# Patient Record
Sex: MALE | Race: BLACK OR AFRICAN AMERICAN | Employment: UNEMPLOYED | URBAN - METROPOLITAN AREA
[De-identification: names, ages, dates, MRNs, and addresses within clinical notes are randomized per-mention and may not be internally consistent; named-entity substitution may affect disease eponyms.]

---

## 2018-01-22 ENCOUNTER — OFFICE VISIT (OUTPATIENT)
Dept: CARDIOLOGY CLINIC | Age: 28
End: 2018-01-22

## 2018-01-22 ENCOUNTER — HOSPITAL ENCOUNTER (OUTPATIENT)
Dept: OTHER | Age: 28
Discharge: HOME | End: 2018-01-22
Attending: INTERNAL MEDICINE | Admitting: INTERNAL MEDICINE

## 2018-01-22 VITALS
DIASTOLIC BLOOD PRESSURE: 60 MMHG | SYSTOLIC BLOOD PRESSURE: 130 MMHG | WEIGHT: 201 LBS | HEIGHT: 78 IN | BODY MASS INDEX: 23.26 KG/M2

## 2018-01-22 DIAGNOSIS — R55 SYNCOPE, UNSPECIFIED SYNCOPE TYPE: ICD-10-CM

## 2018-01-22 DIAGNOSIS — I47.1 SVT (SUPRAVENTRICULAR TACHYCARDIA) (HCC): ICD-10-CM

## 2018-01-22 DIAGNOSIS — I47.1 AVNRT (AV NODAL RE-ENTRY TACHYCARDIA) (HCC): ICD-10-CM

## 2018-01-22 DIAGNOSIS — T82.190S: Primary | ICD-10-CM

## 2018-01-22 DIAGNOSIS — Z95.810 PRESENCE OF SINGLE CHAMBER IMPLANTABLE CARDIOVERTER-DEFIBRILLATOR (ICD): ICD-10-CM

## 2018-01-22 DIAGNOSIS — T82.190S: ICD-10-CM

## 2018-01-22 PROBLEM — I47.19 AVNRT (AV NODAL RE-ENTRY TACHYCARDIA): Status: ACTIVE | Noted: 2018-01-22

## 2018-01-22 PROBLEM — I47.19 INTRA-ATRIAL REENTRANT TACHYCARDIA: Status: ACTIVE | Noted: 2018-01-22

## 2018-01-22 PROCEDURE — 99245 OFF/OP CONSLTJ NEW/EST HI 55: CPT | Performed by: INTERNAL MEDICINE

## 2018-01-22 NOTE — PROGRESS NOTES
has signed to play with a team in [place] [facility] [place] and plans on leaving the country within the next 2-4 weeks. He denies heart racing, skipping, or irregularity of his heart rhythm. He has no had further syncope since 2015. Past Medical History:   Diagnosis Date    Arrhythmia     AVNRT - s/p ablation    Syncope and collapse         Past Surgical History:   Procedure Laterality Date    CARDIAC DEFIBRILLATOR PLACEMENT  2013       Allergies:  No Known Allergies    Social History:        Family History:  family history is not on file. Reviewed. Denies family history of sudden cardiac death, arrhythmia, premature CAD    Review of System:  All other systems reviewed and are negative except for that noted above. Pertinent negatives are:   General: negative for fever, chills   Ophthalmic ROS: negative for - eye pain or loss of vision  ENT ROS: negative for - headaches, sore throat   Respiratory: negative for - cough, sputum  Cardiovascular: Reviewed in HPI  Gastrointestinal: negative for - abdominal pain, diarrhea, N/V  Hematology: negative for - bleeding, blood clots, bruising or jaundice  Genito-Urinary:  negative for - Dysuria or incontinence  Musculoskeletal: negative for - Joint swelling, muscle pain  Neurological: negative for - confusion, dizziness, headaches   Psychiatric: No anxiety, no depression. Dermatological: negative for - rash    Physical Examination:  Vitals:    01/22/18 1253   BP: 130/60      Wt Readings from Last 3 Encounters:   01/22/18 201 lb (91.2 kg)       · Constitutional: Oriented. No distress. · Head: Normocephalic and atraumatic. · Mouth/Throat: Oropharynx is clear and moist.   · Eyes: Conjunctivae normal. EOM are normal.   · Neck: Neck supple. No rigidity. No JVD present. · Cardiovascular: Normal rate, regular rhythm, S1&S2. · Pulmonary/Chest: Bilateral respiratory sounds. No wheezes, No rhonchi. · Abdominal: Soft. Bowel sounds present. No distension, No tenderness. · Musculoskeletal: No tenderness. No edema    · Lymphadenopathy: Has no cervical adenopathy. · Neurological: Alert and oriented. Cranial nerve appears intact, No Gross deficit   · Skin: Skin is warm and dry. No rash noted. · Psychiatric: Has a normal behavior       Labs, diagnostic and imaging results reviewed. Reviewed. ECG:  Deferred. Patient had ECG today at [facility]  Echo: 1/22/18: [facility]   1. The aortic root is mildly dilated. 2. Right ventricle is normal in size and the systolic function is normal.   3. Left ventricle is normal in size and the systolic function is normal.   4. There is no significant pericardial effusion. 5. Compared to the previous echocardiogram of 10/22/2015, there is no significant change. Echo 2/3/17: [facility]  Study Conclusions    - Left ventricle: The cavity size was normal. There was mild    concentric hypertrophy. The calculated ejection fraction was 60%    using the 3D Full Volume method. Wall motion was normal; there    were no regional wall motion abnormalities. Normal diastolic    function. The global longitudinal strain was -19%. - Aorta: Dilated aortic root at the level of sinuses of Valsalva. - Right ventricle: The cavity size was moderately dilated. Wall    thickness was normal. Pacer wire noted in right ventricle.    Systolic function was normal.  - Right atrium: The atrium was mildly dilated. - Inferior vena cava: The vessel was normal in size; the    respirophasic diameter changes were in the normal range (>= 50%);    findings are consistent with normal central venous pressure. Echo 10/22/15 [facility]  Normal cardiac anatomy  Aortic root mildly dilated  PFA, tiny shunt,   RV is normal in size and normal systolic fx  LV normal in size and normal systolic fx  No significant pericardial effusion. Cath: EP study 8/13/13:  AVRT with a concealed L sided AP and polymorphic VT  S/p catheter ablation of left sided AP via transseptal approach.        Medication:  No current outpatient prescriptions on file. No current facility-administered medications for this visit. Assessment and plan:     1. Implanted defibrillator electrode lead fracture, sequela    - XR CHEST STANDARD (2 VW);   -I reviewed the CXR and shows stress Fx just proximal to suture sleeve     2. Presence of single chamber implantable cardioverter-defibrillator (ICD)  Device is off    3. Syncope, unspecified syncope type  Likely vagally mediated. Hydration  Avoidance of triggers      4. AVRT (AV  re-entry tachycardia) (HCC)  S/p ablation of left sided AP    We discussed lead extraction, option of SICD and risks and benfits and alternatives of procedure including but not limited to perforation, need for open heart surgery, vascular injury and death. We will plan on doing the system removal when he returns to [address] [facility] so he has time for recovery. I independently reviewed echo & CXR      Thank you for allowing me to participate in the care of April Thornton. Further evaluation will be based upon the patient's clinical course and testing results. All questions and concerns were addressed to the patient/family. Alternatives to my treatment were discussed. I have discussed the above stated plan and the patient verbalized understanding and agreed with the plan. NOTE: This report was transcribed using voice recognition software. Every effort was made to ensure accuracy, however, inadvertent computerized transcription errors may be present.        Shefali Dumont MD, MPH  [facility]   Office: [phone]

## 2018-01-22 NOTE — LETTER
[address] Cardiology [facility]  [address] [address] [address] [address]  Phone: [phone]  Fax: [phone]    Raffaele De La Rosa MD        January 23, 2018     Renée Olivas MD  No address on file    Patient: Neil Blum  MR Number: [id number]  YOB: 1990  Date of Visit: 1/22/2018    Dear Dr. Renée Olivas:    Below are the relevant portions of my assessment and plan of care. [address]   Electrophysiology Consultation   Date: 1/22/2018  Reason for Consultation:   Consult Requesting Physician: Jorje Monroy MD      Chief Complaint   Patient presents with    Results     F/up echo    Loss of Consciousness        HPI: Neil Blum is a 24 y.o. who is here to discuss RV lead extraction and removal of single chamber ICD. He had a syncopal episode during basketball practice on 12/27/12. He had not had syncope prior to this event. He was initially evaluated by Dr. Bina Bradley at [facility] and underwent stress test, echo, and cardiac MRI. There was some question about patient having hypertrophic cardiomyopathy based on echo results. He was referred to Dr. Lorna Ledbetter in [place] for HCM evaluation. He underwent thorough testing and found that Dorian Marcos did not have HCM. Echo done in [place] showed maximal septal thickness of 1.4. Cardiac MRI septal thickness 1.3 and inferlateral wall was 1.4. Stress echo done during which he develoepd noarrow complex tachycardia with rate 190 bpm.  EPS was recommended to determine if an arrhythmia was the cause of syncope. EPS [facility] on 8/13/13 showed concealed left sided accessory pathway that participated in orthodromic AV reentrant tachycardia. Left sided accessory pathway was ablated. Ventricular stimulation study showed PMVT. He was shocked back to SR and returned on 9/6/13 for single chamber ICD implant for primary prevention given positive ventricular stimulation study. He had another syncopal episode in 2015 although ICD interrogation showed no arrhythmias. Patient developed lead failure with likely fracture with high impedance and noise on the lead. He had no concerning arrhythmias through 3 years of competitive [employer] basketball. Pacing, detection, and therapies were turned off by [facility] EP physicians. Today he is at the visit with his mother. 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 He has signed to play with a team in [facility] and plans on leaving the country within the next 2-4 weeks. He denies heart racing, skipping, or irregularity of his heart rhythm. He has no had further syncope since 2015. Past Medical History:   Diagnosis Date    Arrhythmia     AVNRT - s/p ablation    Syncope and collapse         Past Surgical History:   Procedure Laterality Date    CARDIAC DEFIBRILLATOR PLACEMENT  2013       Allergies:  No Known Allergies    Social History:        Family History:  family history is not on file. Reviewed. Denies family history of sudden cardiac death, arrhythmia, premature CAD    Review of System:  All other systems reviewed and are negative except for that noted above. Pertinent negatives are:   General: negative for fever, chills   Ophthalmic ROS: negative for - eye pain or loss of vision  ENT ROS: negative for - headaches, sore throat   Respiratory: negative for - cough, sputum  Cardiovascular: Reviewed in HPI  Gastrointestinal: negative for - abdominal pain, diarrhea, N/V  Hematology: negative for - bleeding, blood clots, bruising or jaundice  Genito-Urinary:  negative for - Dysuria or incontinence  Musculoskeletal: negative for - Joint swelling, muscle pain  Neurological: negative for - confusion, dizziness, headaches   Psychiatric: No anxiety, no depression.   Dermatological: negative for - rash    Physical Examination:  Vitals:    01/22/18 1253   BP: 130/60      Wt Readings from Last 3 Encounters: 01/22/18 201 lb (91.2 kg)       · Constitutional: Oriented. No distress. · Head: Normocephalic and atraumatic. · Mouth/Throat: Oropharynx is clear and moist.   · Eyes: Conjunctivae normal. EOM are normal.   · Neck: Neck supple. No rigidity. No JVD present. · Cardiovascular: Normal rate, regular rhythm, S1&S2. · Pulmonary/Chest: Bilateral respiratory sounds. No wheezes, No rhonchi. · Abdominal: Soft. Bowel sounds present. No distension, No tenderness. · Musculoskeletal: No tenderness. No edema    · Lymphadenopathy: Has no cervical adenopathy. · Neurological: Alert and oriented. Cranial nerve appears intact, No Gross deficit   · Skin: Skin is warm and dry. No rash noted. · Psychiatric: Has a normal behavior       Labs, diagnostic and imaging results reviewed. Reviewed. ECG:  Deferred. Patient had ECG today at [facility]  Echo: 1/22/18: [facility]   1. The aortic root is mildly dilated. 2. Right ventricle is normal in size and the systolic function is normal.   3. Left ventricle is normal in size and the systolic function is normal.   4. There is no significant pericardial effusion. 5. Compared to the previous echocardiogram of 10/22/2015, there is no significant change. Echo 2/3/17: [facility] Conclusions    - Left ventricle: The cavity size was normal. There was mild    concentric hypertrophy. The calculated ejection fraction was 60%    using the 3D Full Volume method. Wall motion was normal; there    were no regional wall motion abnormalities. Normal diastolic    function. The global longitudinal strain was -19%. - Aorta: Dilated aortic root at the level of sinuses of Valsalva. - Right ventricle: The cavity size was moderately dilated. Wall    thickness was normal. Pacer wire noted in right ventricle.    Systolic function was normal.  - Right atrium: The atrium was mildly dilated. - Inferior vena cava:  The vessel was normal in size; the   respirophasic diameter changes were in the normal range (>= 50%);    findings are consistent with normal central venous pressure. Echo 10/22/15 [facility]  Normal cardiac anatomy  Aortic root mildly dilated  PFA, tiny shunt,   RV is normal in size and normal systolic fx  LV normal in size and normal systolic fx  No significant pericardial effusion. Cath: EP study 8/13/13:  AVRT with a concealed L sided AP and polymorphic VT  S/p catheter ablation of left sided AP via transseptal approach. Medication:  No current outpatient prescriptions on file. No current facility-administered medications for this visit. Assessment and plan:     1. Implanted defibrillator electrode lead fracture, sequela    - XR CHEST STANDARD (2 VW);   -I reviewed the CXR and shows stress Fx just proximal to suture sleeve     2. Presence of single chamber implantable cardioverter-defibrillator (ICD)  Device is off    3. Syncope, unspecified syncope type  Likely vagally mediated. Hydration  Avoidance of triggers      4. AVRT (AV  re-entry tachycardia) (HCC)  S/p ablation of left sided AP    We discussed lead extraction, option of SICD and risks and benfits and alternatives of procedure including but not limited to perforation, need for open heart surgery, vascular injury and death. We will plan on doing the system removal when he returns to [address] [facility] so he has time for recovery. I independently reviewed echo & CXR      Thank you for allowing me to participate in the care of Neil Blum. Further evaluation will be based upon the patient's clinical course and testing results. All questions and concerns were addressed to the patient/family. Alternatives to my treatment were discussed. I have discussed the above stated plan and the patient verbalized understanding and agreed with the plan. NOTE: This report was transcribed using voice recognition software.  Every effort was made to ensure accuracy, however, inadvertent computerized transcription errors may be present. Fam Bravo MD, MPH  [facility]   Office: [phone]              If you have questions, please do not hesitate to call me. I look forward to following Nica Ordaz along with you.     Sincerely,        Georges Gutierrez MD

## 2018-01-23 NOTE — COMMUNICATION BODY
[address]   Electrophysiology Consultation   Date: 1/22/2018  Reason for Consultation:   Consult Requesting Physician: Caroline Nichols MD      Chief Complaint   Patient presents with    Results     F/up echo    Loss of Consciousness        HPI: Tim Bowman is a 24 y.o. who is here to discuss RV lead extraction and removal of single chamber ICD. He had a syncopal episode during basketball practice on 12/27/12. He had not had syncope prior to this event. He was initially evaluated by Dr. Torrie Rausch at [facility] and underwent stress test, echo, and cardiac MRI. There was some question about patient having hypertrophic cardiomyopathy based on echo results. He was referred to Dr. Linnie Councilman in [place] for HCM evaluation. He underwent thorough testing and found that Cheikh Giron did not have HCM. Echo done in [place] showed maximal septal thickness of 1.4. Cardiac MRI septal thickness 1.3 and inferlateral wall was 1.4. Stress echo done during which he develoepd noarrow complex tachycardia with rate 190 bpm.  EPS was recommended to determine if an arrhythmia was the cause of syncope. EPS [facility] on 8/13/13 showed concealed left sided accessory pathway that participated in orthodromic AV reentrant tachycardia. Left sided accessory pathway was ablated. Ventricular stimulation study showed PMVT. He was shocked back to SR and returned on 9/6/13 for single chamber ICD implant for primary prevention given positive ventricular stimulation study. He had another syncopal episode in 2015 although ICD interrogation showed no arrhythmias. Patient developed lead failure with likely fracture with high impedance and noise on the lead. He had no concerning arrhythmias through 3 years of competitive [employer] basketball. Pacing, detection, and therapies were turned off by [facility] EP physicians. Today he is at the visit with his mother. 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   He has signed to play with a team in [facility] and plans on leaving the country within the next 2-4 weeks. He denies heart racing, skipping, or irregularity of his heart rhythm. He has no had further syncope since 2015. Past Medical History:   Diagnosis Date    Arrhythmia     AVNRT - s/p ablation    Syncope and collapse         Past Surgical History:   Procedure Laterality Date    CARDIAC DEFIBRILLATOR PLACEMENT  2013       Allergies:  No Known Allergies    Social History:        Family History:  family history is not on file. Reviewed. Denies family history of sudden cardiac death, arrhythmia, premature CAD    Review of System:  All other systems reviewed and are negative except for that noted above. Pertinent negatives are:   General: negative for fever, chills   Ophthalmic ROS: negative for - eye pain or loss of vision  ENT ROS: negative for - headaches, sore throat   Respiratory: negative for - cough, sputum  Cardiovascular: Reviewed in HPI  Gastrointestinal: negative for - abdominal pain, diarrhea, N/V  Hematology: negative for - bleeding, blood clots, bruising or jaundice  Genito-Urinary:  negative for - Dysuria or incontinence  Musculoskeletal: negative for - Joint swelling, muscle pain  Neurological: negative for - confusion, dizziness, headaches   Psychiatric: No anxiety, no depression. Dermatological: negative for - rash    Physical Examination:  Vitals:    01/22/18 1253   BP: 130/60      Wt Readings from Last 3 Encounters:   01/22/18 201 lb (91.2 kg)       · Constitutional: Oriented. No distress. · Head: Normocephalic and atraumatic. · Mouth/Throat: Oropharynx is clear and moist.   · Eyes: Conjunctivae normal. EOM are normal.   · Neck: Neck supple. No rigidity. No JVD present. · Cardiovascular: Normal rate, regular rhythm, S1&S2. · Pulmonary/Chest: Bilateral respiratory sounds. No wheezes, No rhonchi. · Abdominal: Soft. Bowel sounds present. No distension, No tenderness. current outpatient prescriptions on file. No current facility-administered medications for this visit. Assessment and plan:     1. Implanted defibrillator electrode lead fracture, sequela    - XR CHEST STANDARD (2 VW);   -I reviewed the CXR and shows stress Fx just proximal to suture sleeve     2. Presence of single chamber implantable cardioverter-defibrillator (ICD)  Device is off    3. Syncope, unspecified syncope type  Likely vagally mediated. Hydration  Avoidance of triggers      4. AVRT (AV  re-entry tachycardia) (HCC)  S/p ablation of left sided AP    We discussed lead extraction, option of SICD and risks and benfits and alternatives of procedure including but not limited to perforation, need for open heart surgery, vascular injury and death. We will plan on doing the system removal when he returns to [address] [facility] so he has time for recovery. I independently reviewed echo & CXR      Thank you for allowing me to participate in the care of Neil Blum. Further evaluation will be based upon the patient's clinical course and testing results. All questions and concerns were addressed to the patient/family. Alternatives to my treatment were discussed. I have discussed the above stated plan and the patient verbalized understanding and agreed with the plan. NOTE: This report was transcribed using voice recognition software. Every effort was made to ensure accuracy, however, inadvertent computerized transcription errors may be present.        Michael Freire MD, MPH  [address] [address]   Office: [phone]

## 2018-10-08 ENCOUNTER — TELEPHONE (OUTPATIENT)
Dept: CARDIOLOGY CLINIC | Age: 28
End: 2018-10-08

## 2018-10-08 ENCOUNTER — HOSPITAL ENCOUNTER (OUTPATIENT)
Age: 28
Discharge: HOME | End: 2018-10-08
Payer: COMMERCIAL

## 2018-10-08 DIAGNOSIS — I47.1: ICD-10-CM

## 2018-10-08 DIAGNOSIS — Z95.810 PRESENCE OF SINGLE CHAMBER IMPLANTABLE CARDIOVERTER-DEFIBRILLATOR (ICD): ICD-10-CM

## 2018-10-08 DIAGNOSIS — R55 SYNCOPE, UNSPECIFIED SYNCOPE TYPE: ICD-10-CM

## 2018-10-08 LAB
A/G RATIO: 1.4 (ref 1.1–2.2)
ABO/RH: NORMAL
ALBUMIN SERPL-MCNC: 4.1 G/DL (ref 3.4–5)
ALP BLD-CCNC: 59 U/L (ref 40–129)
ALT SERPL-CCNC: 14 U/L (ref 10–40)
ANION GAP SERPL CALCULATED.3IONS-SCNC: 11 MMOL/L (ref 3–16)
ANTIBODY SCREEN: NORMAL
AST SERPL-CCNC: 21 U/L (ref 15–37)
BILIRUB SERPL-MCNC: 0.6 MG/DL (ref 0–1)
BUN BLDV-MCNC: 11 MG/DL (ref 7–20)
CALCIUM SERPL-MCNC: 9.7 MG/DL (ref 8.3–10.6)
CHLORIDE BLD-SCNC: 100 MMOL/L (ref 99–110)
CO2: 28 MMOL/L (ref 21–32)
CREAT SERPL-MCNC: 1.2 MG/DL (ref 0.9–1.3)
GFR AFRICAN AMERICAN: >60
GFR NON-AFRICAN AMERICAN: >60
GLOBULIN: 3 G/DL
GLUCOSE BLD-MCNC: 99 MG/DL (ref 70–99)
HCT VFR BLD CALC: 44 % (ref 40.5–52.5)
HEMOGLOBIN: 13.8 G/DL (ref 13.5–17.5)
INR BLD: 1.01 (ref 0.86–1.14)
MAGNESIUM: 2.2 MG/DL (ref 1.8–2.4)
MCH RBC QN AUTO: 22.7 PG (ref 26–34)
MCHC RBC AUTO-ENTMCNC: 31.4 G/DL (ref 31–36)
MCV RBC AUTO: 72.2 FL (ref 80–100)
PDW BLD-RTO: 15.3 % (ref 12.4–15.4)
PLATELET # BLD: 204 K/UL (ref 135–450)
PMV BLD AUTO: 8.3 FL (ref 5–10.5)
POTASSIUM SERPL-SCNC: 4.5 MMOL/L (ref 3.5–5.1)
PROTHROMBIN TIME: 11.5 SEC (ref 9.8–13)
RBC # BLD: 6.1 M/UL (ref 4.2–5.9)
SODIUM BLD-SCNC: 139 MMOL/L (ref 136–145)
TOTAL PROTEIN: 7.1 G/DL (ref 6.4–8.2)
WBC # BLD: 3.2 K/UL (ref 4–11)

## 2018-10-08 PROCEDURE — 86850 RBC ANTIBODY SCREEN: CPT

## 2018-10-08 PROCEDURE — 85027 COMPLETE CBC AUTOMATED: CPT

## 2018-10-08 PROCEDURE — 83735 ASSAY OF MAGNESIUM: CPT

## 2018-10-08 PROCEDURE — 86900 BLOOD TYPING SEROLOGIC ABO: CPT

## 2018-10-08 PROCEDURE — 36415 COLL VENOUS BLD VENIPUNCTURE: CPT

## 2018-10-08 PROCEDURE — 80053 COMPREHEN METABOLIC PANEL: CPT

## 2018-10-08 PROCEDURE — 85610 PROTHROMBIN TIME: CPT

## 2018-10-08 PROCEDURE — 86901 BLOOD TYPING SEROLOGIC RH(D): CPT

## 2018-10-09 ENCOUNTER — ANESTHESIA (OUTPATIENT)
Dept: CARDIAC CATH/INVASIVE PROCEDURES | Age: 28
End: 2018-10-09
Payer: COMMERCIAL

## 2018-10-09 ENCOUNTER — HOSPITAL ENCOUNTER (OUTPATIENT)
Dept: CARDIAC CATH/INVASIVE PROCEDURES | Age: 28
LOS: 1 days | Discharge: HOME | End: 2018-10-10
Attending: INTERNAL MEDICINE | Admitting: INTERNAL MEDICINE
Payer: COMMERCIAL

## 2018-10-09 ENCOUNTER — ANESTHESIA EVENT (OUTPATIENT)
Dept: CARDIAC CATH/INVASIVE PROCEDURES | Age: 28
End: 2018-10-09
Payer: COMMERCIAL

## 2018-10-09 VITALS
TEMPERATURE: 96.3 F | OXYGEN SATURATION: 97 % | RESPIRATION RATE: 7 BRPM | SYSTOLIC BLOOD PRESSURE: 140 MMHG | DIASTOLIC BLOOD PRESSURE: 64 MMHG

## 2018-10-09 PROBLEM — T82.190A IMPLANTED DEFIBRILLATOR ELECTRODE LEAD FRACTURE: Status: ACTIVE | Noted: 2018-10-09

## 2018-10-09 LAB
ABO/RH: NORMAL
ANTIBODY SCREEN: NORMAL
BLOOD BANK DISPENSE STATUS: NORMAL
BLOOD BANK PRODUCT CODE: NORMAL
BPU ID: NORMAL
DESCRIPTION BLOOD BANK: NORMAL

## 2018-10-09 PROCEDURE — 3600000017 HC SURGERY HYBRID ADDL 15MIN

## 2018-10-09 PROCEDURE — 2580000003 HC RX 258

## 2018-10-09 PROCEDURE — 2580000003 HC RX 258: Performed by: INTERNAL MEDICINE

## 2018-10-09 PROCEDURE — 86900 BLOOD TYPING SEROLOGIC ABO: CPT

## 2018-10-09 PROCEDURE — 6360000002 HC RX W HCPCS

## 2018-10-09 PROCEDURE — 93005 ELECTROCARDIOGRAM TRACING: CPT | Performed by: INTERNAL MEDICINE

## 2018-10-09 PROCEDURE — 86901 BLOOD TYPING SEROLOGIC RH(D): CPT

## 2018-10-09 PROCEDURE — 33244 REMOVE ELCTRD TRANSVENOUSLY: CPT | Performed by: INTERNAL MEDICINE

## 2018-10-09 PROCEDURE — 2720000010 HC SURG SUPPLY STERILE

## 2018-10-09 PROCEDURE — C1894 INTRO/SHEATH, NON-LASER: HCPCS

## 2018-10-09 PROCEDURE — 86850 RBC ANTIBODY SCREEN: CPT

## 2018-10-09 PROCEDURE — 2500000003 HC RX 250 WO HCPCS: Performed by: NURSE ANESTHETIST, CERTIFIED REGISTERED

## 2018-10-09 PROCEDURE — 2580000003 HC RX 258: Performed by: NURSE ANESTHETIST, CERTIFIED REGISTERED

## 2018-10-09 PROCEDURE — 93010 ELECTROCARDIOGRAM REPORT: CPT | Performed by: INTERNAL MEDICINE

## 2018-10-09 PROCEDURE — 3700000000 HC ANESTHESIA ATTENDED CARE

## 2018-10-09 PROCEDURE — 7100000000 HC PACU RECOVERY - FIRST 15 MIN

## 2018-10-09 PROCEDURE — C1729 CATH, DRAINAGE: HCPCS

## 2018-10-09 PROCEDURE — 6370000000 HC RX 637 (ALT 250 FOR IP): Performed by: INTERNAL MEDICINE

## 2018-10-09 PROCEDURE — 7100000001 HC PACU RECOVERY - ADDTL 15 MIN

## 2018-10-09 PROCEDURE — 33241 REMOVE PULSE GENERATOR: CPT | Performed by: INTERNAL MEDICINE

## 2018-10-09 PROCEDURE — 86923 COMPATIBILITY TEST ELECTRIC: CPT

## 2018-10-09 PROCEDURE — 36415 COLL VENOUS BLD VENIPUNCTURE: CPT

## 2018-10-09 PROCEDURE — 3700000001 HC ADD 15 MINUTES (ANESTHESIA)

## 2018-10-09 PROCEDURE — 36556 INSERT NON-TUNNEL CV CATH: CPT | Performed by: INTERNAL MEDICINE

## 2018-10-09 PROCEDURE — 36620 INSERTION CATHETER ARTERY: CPT | Performed by: INTERNAL MEDICINE

## 2018-10-09 PROCEDURE — 2709999900 HC NON-CHARGEABLE SUPPLY

## 2018-10-09 PROCEDURE — 93308 TTE F-UP OR LMTD: CPT

## 2018-10-09 PROCEDURE — 3600000007 HC SURGERY HYBRID BASE

## 2018-10-09 PROCEDURE — 6360000002 HC RX W HCPCS: Performed by: INTERNAL MEDICINE

## 2018-10-09 PROCEDURE — 2500000003 HC RX 250 WO HCPCS

## 2018-10-09 PROCEDURE — 6360000002 HC RX W HCPCS: Performed by: NURSE ANESTHETIST, CERTIFIED REGISTERED

## 2018-10-09 RX ORDER — ONDANSETRON 2 MG/ML
INJECTION INTRAMUSCULAR; INTRAVENOUS PRN
Status: DISCONTINUED | OUTPATIENT
Start: 2018-10-09 | End: 2018-10-09 | Stop reason: SDUPTHER

## 2018-10-09 RX ORDER — LIDOCAINE HYDROCHLORIDE 20 MG/ML
INJECTION, SOLUTION EPIDURAL; INFILTRATION; INTRACAUDAL; PERINEURAL PRN
Status: DISCONTINUED | OUTPATIENT
Start: 2018-10-09 | End: 2018-10-09 | Stop reason: SDUPTHER

## 2018-10-09 RX ORDER — HYDRALAZINE HYDROCHLORIDE 20 MG/ML
5 INJECTION INTRAMUSCULAR; INTRAVENOUS EVERY 10 MIN PRN
Status: DISCONTINUED | OUTPATIENT
Start: 2018-10-09 | End: 2018-10-09

## 2018-10-09 RX ORDER — FENTANYL CITRATE 50 UG/ML
INJECTION, SOLUTION INTRAMUSCULAR; INTRAVENOUS PRN
Status: DISCONTINUED | OUTPATIENT
Start: 2018-10-09 | End: 2018-10-09 | Stop reason: SDUPTHER

## 2018-10-09 RX ORDER — HYDROMORPHONE HCL 110MG/55ML
0.5 PATIENT CONTROLLED ANALGESIA SYRINGE INTRAVENOUS EVERY 5 MIN PRN
Status: DISCONTINUED | OUTPATIENT
Start: 2018-10-09 | End: 2018-10-09

## 2018-10-09 RX ORDER — DEXAMETHASONE SODIUM PHOSPHATE 4 MG/ML
INJECTION, SOLUTION INTRA-ARTICULAR; INTRALESIONAL; INTRAMUSCULAR; INTRAVENOUS; SOFT TISSUE PRN
Status: DISCONTINUED | OUTPATIENT
Start: 2018-10-09 | End: 2018-10-09 | Stop reason: SDUPTHER

## 2018-10-09 RX ORDER — CEFAZOLIN 1 G/1
2 INJECTION, POWDER, FOR SOLUTION INTRAVENOUS ONCE
Status: DISCONTINUED | OUTPATIENT
Start: 2018-10-09 | End: 2018-10-09 | Stop reason: CLARIF

## 2018-10-09 RX ORDER — LABETALOL HYDROCHLORIDE 5 MG/ML
5 INJECTION, SOLUTION INTRAVENOUS EVERY 10 MIN PRN
Status: DISCONTINUED | OUTPATIENT
Start: 2018-10-09 | End: 2018-10-09

## 2018-10-09 RX ORDER — ONDANSETRON 2 MG/ML
4 INJECTION INTRAMUSCULAR; INTRAVENOUS EVERY 6 HOURS PRN
Status: DISCONTINUED | OUTPATIENT
Start: 2018-10-09 | End: 2018-10-10 | Stop reason: HOSPADM

## 2018-10-09 RX ORDER — MEPERIDINE HYDROCHLORIDE 25 MG/ML
12.5 INJECTION INTRAMUSCULAR; INTRAVENOUS; SUBCUTANEOUS EVERY 5 MIN PRN
Status: DISCONTINUED | OUTPATIENT
Start: 2018-10-09 | End: 2018-10-09

## 2018-10-09 RX ORDER — SODIUM CHLORIDE 0.9 % (FLUSH) 0.9 %
10 SYRINGE (ML) INJECTION PRN
Status: DISCONTINUED | OUTPATIENT
Start: 2018-10-09 | End: 2018-10-10 | Stop reason: HOSPADM

## 2018-10-09 RX ORDER — SODIUM CHLORIDE 0.9 % (FLUSH) 0.9 %
10 SYRINGE (ML) INJECTION EVERY 12 HOURS SCHEDULED
Status: DISCONTINUED | OUTPATIENT
Start: 2018-10-09 | End: 2018-10-10 | Stop reason: HOSPADM

## 2018-10-09 RX ORDER — EPHEDRINE SULFATE 50 MG/ML
INJECTION, SOLUTION INTRAVENOUS PRN
Status: DISCONTINUED | OUTPATIENT
Start: 2018-10-09 | End: 2018-10-09 | Stop reason: SDUPTHER

## 2018-10-09 RX ORDER — VECURONIUM BROMIDE 1 MG/ML
INJECTION, POWDER, LYOPHILIZED, FOR SOLUTION INTRAVENOUS PRN
Status: DISCONTINUED | OUTPATIENT
Start: 2018-10-09 | End: 2018-10-09 | Stop reason: SDUPTHER

## 2018-10-09 RX ORDER — CEFAZOLIN SODIUM 1 G/3ML
INJECTION, POWDER, FOR SOLUTION INTRAMUSCULAR; INTRAVENOUS PRN
Status: DISCONTINUED | OUTPATIENT
Start: 2018-10-09 | End: 2018-10-09 | Stop reason: SDUPTHER

## 2018-10-09 RX ORDER — SODIUM CHLORIDE 9 MG/ML
INJECTION, SOLUTION INTRAVENOUS CONTINUOUS PRN
Status: DISCONTINUED | OUTPATIENT
Start: 2018-10-09 | End: 2018-10-09 | Stop reason: SDUPTHER

## 2018-10-09 RX ORDER — SUCCINYLCHOLINE/SOD CL,ISO/PF 100 MG/5ML
SYRINGE (ML) INTRAVENOUS PRN
Status: DISCONTINUED | OUTPATIENT
Start: 2018-10-09 | End: 2018-10-09 | Stop reason: SDUPTHER

## 2018-10-09 RX ORDER — NEOSTIGMINE METHYLSULFATE 5 MG/5 ML
SYRINGE (ML) INTRAVENOUS PRN
Status: DISCONTINUED | OUTPATIENT
Start: 2018-10-09 | End: 2018-10-09 | Stop reason: SDUPTHER

## 2018-10-09 RX ORDER — MIDAZOLAM HYDROCHLORIDE 1 MG/ML
INJECTION INTRAMUSCULAR; INTRAVENOUS PRN
Status: DISCONTINUED | OUTPATIENT
Start: 2018-10-09 | End: 2018-10-09 | Stop reason: SDUPTHER

## 2018-10-09 RX ORDER — OXYCODONE HYDROCHLORIDE AND ACETAMINOPHEN 5; 325 MG/1; MG/1
1 TABLET ORAL EVERY 4 HOURS PRN
Status: DISCONTINUED | OUTPATIENT
Start: 2018-10-09 | End: 2018-10-10 | Stop reason: HOSPADM

## 2018-10-09 RX ORDER — ONDANSETRON 2 MG/ML
4 INJECTION INTRAMUSCULAR; INTRAVENOUS
Status: DISCONTINUED | OUTPATIENT
Start: 2018-10-09 | End: 2018-10-09

## 2018-10-09 RX ORDER — PROPOFOL 10 MG/ML
INJECTION, EMULSION INTRAVENOUS PRN
Status: DISCONTINUED | OUTPATIENT
Start: 2018-10-09 | End: 2018-10-09 | Stop reason: SDUPTHER

## 2018-10-09 RX ORDER — CEFAZOLIN SODIUM 2 G/100ML
2 INJECTION, SOLUTION INTRAVENOUS
Status: COMPLETED | OUTPATIENT
Start: 2018-10-09 | End: 2018-10-09

## 2018-10-09 RX ORDER — OXYCODONE HYDROCHLORIDE AND ACETAMINOPHEN 5; 325 MG/1; MG/1
1 TABLET ORAL
Status: DISCONTINUED | OUTPATIENT
Start: 2018-10-09 | End: 2018-10-09

## 2018-10-09 RX ORDER — ACETAMINOPHEN 325 MG/1
650 TABLET ORAL EVERY 4 HOURS PRN
Status: DISCONTINUED | OUTPATIENT
Start: 2018-10-09 | End: 2018-10-10 | Stop reason: HOSPADM

## 2018-10-09 RX ORDER — GLYCOPYRROLATE 1 MG/5 ML
SYRINGE (ML) INTRAVENOUS PRN
Status: DISCONTINUED | OUTPATIENT
Start: 2018-10-09 | End: 2018-10-09 | Stop reason: SDUPTHER

## 2018-10-09 RX ADMIN — LIDOCAINE HYDROCHLORIDE 100 MG: 20 INJECTION, SOLUTION EPIDURAL; INFILTRATION; INTRACAUDAL; PERINEURAL at 07:24

## 2018-10-09 RX ADMIN — OXYCODONE HYDROCHLORIDE AND ACETAMINOPHEN 1 TABLET: 5; 325 TABLET ORAL at 15:58

## 2018-10-09 RX ADMIN — CEFAZOLIN 2000 MG: 1 INJECTION, POWDER, FOR SOLUTION INTRAVENOUS at 09:37

## 2018-10-09 RX ADMIN — PROPOFOL 250 MG: 10 INJECTION, EMULSION INTRAVENOUS at 07:24

## 2018-10-09 RX ADMIN — SODIUM CHLORIDE: 9 INJECTION, SOLUTION INTRAVENOUS at 07:15

## 2018-10-09 RX ADMIN — Medication 3 MG: at 09:15

## 2018-10-09 RX ADMIN — Medication 200 MG: at 07:25

## 2018-10-09 RX ADMIN — EPHEDRINE SULFATE 5 MG: 50 INJECTION, SOLUTION INTRAMUSCULAR; INTRAVENOUS; SUBCUTANEOUS at 08:45

## 2018-10-09 RX ADMIN — EPHEDRINE SULFATE 5 MG: 50 INJECTION, SOLUTION INTRAMUSCULAR; INTRAVENOUS; SUBCUTANEOUS at 08:38

## 2018-10-09 RX ADMIN — ONDANSETRON HYDROCHLORIDE 4 MG: 2 SOLUTION INTRAMUSCULAR; INTRAVENOUS at 07:32

## 2018-10-09 RX ADMIN — CEFAZOLIN SODIUM 2 G: 2 INJECTION, SOLUTION INTRAVENOUS at 10:04

## 2018-10-09 RX ADMIN — SODIUM CHLORIDE: 9 INJECTION, SOLUTION INTRAVENOUS at 08:39

## 2018-10-09 RX ADMIN — MIDAZOLAM HYDROCHLORIDE 2 MG: 1 INJECTION, SOLUTION INTRAMUSCULAR; INTRAVENOUS at 07:12

## 2018-10-09 RX ADMIN — Medication 0.5 MG: at 09:15

## 2018-10-09 RX ADMIN — Medication 10 ML: at 20:31

## 2018-10-09 RX ADMIN — EPHEDRINE SULFATE 5 MG: 50 INJECTION, SOLUTION INTRAMUSCULAR; INTRAVENOUS; SUBCUTANEOUS at 08:12

## 2018-10-09 RX ADMIN — VECURONIUM BROMIDE 2 MG: 1 INJECTION, POWDER, LYOPHILIZED, FOR SOLUTION INTRAVENOUS at 07:50

## 2018-10-09 RX ADMIN — FENTANYL CITRATE 100 MCG: 50 INJECTION, SOLUTION INTRAMUSCULAR; INTRAVENOUS at 07:24

## 2018-10-09 RX ADMIN — DEXAMETHASONE SODIUM PHOSPHATE 4 MG: 4 INJECTION, SOLUTION INTRA-ARTICULAR; INTRALESIONAL; INTRAMUSCULAR; INTRAVENOUS; SOFT TISSUE at 07:32

## 2018-10-09 RX ADMIN — EPHEDRINE SULFATE 5 MG: 50 INJECTION, SOLUTION INTRAMUSCULAR; INTRAVENOUS; SUBCUTANEOUS at 08:50

## 2018-10-09 RX ADMIN — VECURONIUM BROMIDE 4 MG: 1 INJECTION, POWDER, LYOPHILIZED, FOR SOLUTION INTRAVENOUS at 07:42

## 2018-10-09 RX ADMIN — EPHEDRINE SULFATE 5 MG: 50 INJECTION, SOLUTION INTRAMUSCULAR; INTRAVENOUS; SUBCUTANEOUS at 08:15

## 2018-10-09 ASSESSMENT — PULMONARY FUNCTION TESTS
PIF_VALUE: 16
PIF_VALUE: 15
PIF_VALUE: 16
PIF_VALUE: 13
PIF_VALUE: 16
PIF_VALUE: 23
PIF_VALUE: 16
PIF_VALUE: 1
PIF_VALUE: 16
PIF_VALUE: 23
PIF_VALUE: 16
PIF_VALUE: 16
PIF_VALUE: 11
PIF_VALUE: 16
PIF_VALUE: 16
PIF_VALUE: 1
PIF_VALUE: 16
PIF_VALUE: 2
PIF_VALUE: 23
PIF_VALUE: 16
PIF_VALUE: 23
PIF_VALUE: 23
PIF_VALUE: 16
PIF_VALUE: 23
PIF_VALUE: 16
PIF_VALUE: 16
PIF_VALUE: 0
PIF_VALUE: 16
PIF_VALUE: 15
PIF_VALUE: 16
PIF_VALUE: 4
PIF_VALUE: 16
PIF_VALUE: 23
PIF_VALUE: 16
PIF_VALUE: 22
PIF_VALUE: 16
PIF_VALUE: 23
PIF_VALUE: 16
PIF_VALUE: 16
PIF_VALUE: 0
PIF_VALUE: 0
PIF_VALUE: 16
PIF_VALUE: 16
PIF_VALUE: 2
PIF_VALUE: 16
PIF_VALUE: 16
PIF_VALUE: 2
PIF_VALUE: 16
PIF_VALUE: 1
PIF_VALUE: 16
PIF_VALUE: 16
PIF_VALUE: 23
PIF_VALUE: 16
PIF_VALUE: 2
PIF_VALUE: 17
PIF_VALUE: 24
PIF_VALUE: 16
PIF_VALUE: 23
PIF_VALUE: 16
PIF_VALUE: 15
PIF_VALUE: 16
PIF_VALUE: 24
PIF_VALUE: 16
PIF_VALUE: 16
PIF_VALUE: 23
PIF_VALUE: 16
PIF_VALUE: 24
PIF_VALUE: 25
PIF_VALUE: 23
PIF_VALUE: 15
PIF_VALUE: 23
PIF_VALUE: 16
PIF_VALUE: 1
PIF_VALUE: 16
PIF_VALUE: 23
PIF_VALUE: 16
PIF_VALUE: 0
PIF_VALUE: 23
PIF_VALUE: 0
PIF_VALUE: 9
PIF_VALUE: 16
PIF_VALUE: 21
PIF_VALUE: 16
PIF_VALUE: 15
PIF_VALUE: 11
PIF_VALUE: 16
PIF_VALUE: 23
PIF_VALUE: 23
PIF_VALUE: 2
PIF_VALUE: 12
PIF_VALUE: 16
PIF_VALUE: 10
PIF_VALUE: 23
PIF_VALUE: 16
PIF_VALUE: 16

## 2018-10-09 ASSESSMENT — PAIN SCALES - GENERAL
PAINLEVEL_OUTOF10: 5
PAINLEVEL_OUTOF10: 0
PAINLEVEL_OUTOF10: 0

## 2018-10-09 NOTE — PROCEDURES
vein using modified seldinger technique. An amplantz wire was advanced from the  right  femoral vein to the right subclavian vein for potential deployment of balloon in SVC if needed. DAVID probe was inserted and images were obtained. A 4F right femoral arterial line was placed inside the right femoral artery for hemodynamic monitoring during the procedure. After injection of 2% lidocaine in the left pectoral area, an incision was made over the old scar and extended to to the pocket using electrocautery and blunt dissection. The old pulse generator was explanted. Leads and device were released from the scar tissues inside the pocket using electrocautery. The lead was cut. A locking stylet was advanced inside the lead and locked in. A bulldog lead extender was used and conductor cables of the defibrillator lead were locked. Suture tie was made around the lead. Laser sheath over the lead and using gentle traction and counter-traction and advancement of the laser sheath over the lead, it was released from the scar tissue. There was very dense scar tissue. The pocket was then closed in three separate subcutaneous layers using  3-0 Vicryl and subcuticular layer using 4-0 Vicryl. The patient tolerated the procedure well and there were no immediate complications. Patient was transported to the holding area in stable condition. All sponge and needle counts were reported as correct at the end of the procedure. The patient tolerated the procedure well and no complication noted at the end of procedure.  Patient was extubated and transferred to the floor in stable condition         Plan:   The patient will be admitted to the CVU and have usual  care, including chest x-ray, and antibiotic therapy and echo

## 2018-10-09 NOTE — PROGRESS NOTES
Patient brought over to CVU from cath lab and attached to CVU monitoring. Report reviewed with cath lab RN. right groin soft and no hematoma or oozing noted. Occlusive dressing dry and intact. Pedal pulses easily palpated. Primary RN Rosa M Hooper.
Pt resting quietly in bed, awakens to voice, denies pain. VSS, O2 sats 99% on room air, sinus bradycardia, BP stable. Dressing on left chest dry and intact, right groin soft, tegaderm dry and intact. Pt seen by anesthesia, awaiting bed in CVU. Will continue to monitor until room available. Family in waiting room updated on pt status in PACU.
rhythm, S1&S2. · Pulmonary/Chest: Bilateral respiratory sounds. No wheezes, No rhonchi. · Abdominal: Soft. Bowel sounds present. No distension, No tenderness. · Musculoskeletal: No tenderness. No edema    · Lymphadenopathy: Has no cervical adenopathy. · Neurological: Alert and oriented. Cranial nerve appears intact, No Gross deficit   · Skin: Skin is warm and dry. No rash noted. · Psychiatric: Has a normal behavior       Labs, diagnostic and imaging results reviewed. Reviewed. ECG:  Deferred. Patient had ECG today at [facility]  Echo: 1/22/18: [facility]   1. The aortic root is mildly dilated. 2. Right ventricle is normal in size and the systolic function is normal.   3. Left ventricle is normal in size and the systolic function is normal.   4. There is no significant pericardial effusion. 5. Compared to the previous echocardiogram of 10/22/2015, there is no significant change. Echo 2/3/17: [facility] Conclusions    - Left ventricle: The cavity size was normal. There was mild    concentric hypertrophy. The calculated ejection fraction was 60%    using the 3D Full Volume method. Wall motion was normal; there    were no regional wall motion abnormalities. Normal diastolic    function. The global longitudinal strain was -19%. - Aorta: Dilated aortic root at the level of sinuses of Valsalva. - Right ventricle: The cavity size was moderately dilated. Wall    thickness was normal. Pacer wire noted in right ventricle.    Systolic function was normal.  - Right atrium: The atrium was mildly dilated. - Inferior vena cava: The vessel was normal in size; the    respirophasic diameter changes were in the normal range (>= 50%);    findings are consistent with normal central venous pressure. Echo 10/22/15 [facility]  Normal cardiac anatomy  Aortic root mildly dilated  PFA, tiny shunt,   RV is normal in size and normal systolic fx  LV normal in size and normal systolic fx  No significant pericardial effusion.     Cath:

## 2018-10-10 ENCOUNTER — APPOINTMENT (OUTPATIENT)
Dept: GENERAL RADIOLOGY | Age: 28
End: 2018-10-10
Attending: INTERNAL MEDICINE
Payer: COMMERCIAL

## 2018-10-10 ENCOUNTER — TELEPHONE (OUTPATIENT)
Dept: CARDIOLOGY CLINIC | Age: 28
End: 2018-10-10

## 2018-10-10 VITALS
DIASTOLIC BLOOD PRESSURE: 78 MMHG | HEIGHT: 78 IN | TEMPERATURE: 97.9 F | BODY MASS INDEX: 31.04 KG/M2 | OXYGEN SATURATION: 99 % | RESPIRATION RATE: 16 BRPM | WEIGHT: 268.3 LBS | HEART RATE: 54 BPM | SYSTOLIC BLOOD PRESSURE: 150 MMHG

## 2018-10-10 PROCEDURE — 3600000007 HC SURGERY HYBRID BASE

## 2018-10-10 PROCEDURE — 3600000017 HC SURGERY HYBRID ADDL 15MIN

## 2018-10-10 PROCEDURE — 71046 X-RAY EXAM CHEST 2 VIEWS: CPT

## 2018-10-10 ASSESSMENT — PAIN SCALES - GENERAL
PAINLEVEL_OUTOF10: 0

## 2018-10-11 LAB
EKG ATRIAL RATE: 47 BPM
EKG DIAGNOSIS: NORMAL
EKG P AXIS: 43 DEGREES
EKG P-R INTERVAL: 196 MS
EKG Q-T INTERVAL: 456 MS
EKG QRS DURATION: 92 MS
EKG QTC CALCULATION (BAZETT): 403 MS
EKG R AXIS: 43 DEGREES
EKG T AXIS: 44 DEGREES
EKG VENTRICULAR RATE: 47 BPM

## 2018-11-20 LAB
LEFT VENTRICULAR EJECTION FRACTION HIGH VALUE: 65 %
LEFT VENTRICULAR EJECTION FRACTION MODE: NORMAL
LV EF: 60 %

## 2020-08-18 ENCOUNTER — OFFICE VISIT (OUTPATIENT)
Dept: ENT CLINIC | Age: 30
End: 2020-08-18
Payer: MEDICAID

## 2020-08-18 VITALS
SYSTOLIC BLOOD PRESSURE: 122 MMHG | HEIGHT: 78 IN | WEIGHT: 267.2 LBS | BODY MASS INDEX: 30.92 KG/M2 | HEART RATE: 63 BPM | DIASTOLIC BLOOD PRESSURE: 75 MMHG | TEMPERATURE: 97.3 F

## 2020-08-18 PROCEDURE — 31575 DIAGNOSTIC LARYNGOSCOPY: CPT | Performed by: OTOLARYNGOLOGY

## 2020-08-18 PROCEDURE — 99243 OFF/OP CNSLTJ NEW/EST LOW 30: CPT | Performed by: OTOLARYNGOLOGY

## 2020-08-18 NOTE — PROGRESS NOTES
CHIEF COMPLAINT: Throat pain. HISTORY OF PRESENT ILLNESS:  34 y.o. male referred by Dr. Siddharth Gan who presents with left-sided throat pain which arose 2 to 3 weeks ago. The pain was in the left submandibular area, radiated to the left ear, and increased with deglutition. No fever. Seen at urgent care and treated with antibiotics with no initial improvement. .  Now symptom-free. This was the first episode. PAST MEDICAL HISTORY:   Social History     Tobacco Use   Smoking Status Never Smoker   Smokeless Tobacco Never Used                                                    Social History     Substance and Sexual Activity   Alcohol Use No                                                  No current outpatient medications on file. Past Medical History:   Diagnosis Date    Allergic rhinitis     Arrhythmia     AVNRT - s/p ablation    Fracture of nasal bone     Nosebleed     Syncope and collapse                                                     Past Surgical History:   Procedure Laterality Date    CARDIAC DEFIBRILLATOR PLACEMENT  2013    KNEE SURGERY Right     torn meniscus     FAMILY HISTORY: Family history reviewed. Except as noted in history of present illness, there is no pertinent family history      REVIEW OF SYSTEMS:  All pertinent positive and negative review of systems included in HPI. Otherwise, all systems are reviewed and negative. PHYSICAL EXAMINATION:   GENERAL: wdwn- no acute distress  RESPIRATORY:  No stridor or respiratory distress  COMMUNICATION :  Normal voice  MENTAL STATUS:  Mood and affect normal, oriented X 3  HEAD AND FACE:  No abnormalities of the skin of face or head  EXTERNAL EARS AND NOSE:  Normal pinnae bilateral  FACIAL MUSCLES:  All branches of facial nerve intact  EXTRAOCULAR MUSCLES: Intact with full range of motion  FACE PALPATION:  No tenderness over sinuses.   Zygomatic arches and orbital rims intact  OTOSCOPY:  Normal external auditory canals, tympanic membranes, and middle ear spaces  TUNING FORKS: Rinne ++ Fowler midline at 512 Hz  INTRANASAL:  Septum midline, turbinates normal, meati clear. LIPS, TEETH, GINGIVA:  Normal mucosa  PHARYNX:  Normal. Palpation of the left tonsil is negative. NECK:  No masses. Tender left hyoid bone. LYMPHATIC:  No cervical adenopathy  SALIVARY GLANDS:  No swelling or masses in the parotid or submandibular salivary glands  THYROID:  No goiter or thyroid masses. As the patient has symptoms suggestive of disease in the larynx or hypopharynx, fiberoptic laryngoscopy is performed. FIBEROPTIC LARYNGOSCOPY:  Nares topically anaesthetized with lidocaine spray. Fiberoptic scope passed per naris into nasopharynx and hypopharyrnx and larynx visualized. Normal tongue base                                                          Normal epiglottis                                                          Normal vocal cords                                                          Normal pyriform sinuses  IMPRESSION: Suspect left submandibular pain radiating to the ear with inflammation of the hyoid bone and the stylohyoid ligament. PLAN: Patient is now symptom-free will not prescribe. FOLLOW-UP: To be seen acutely if symptoms recur.

## 2020-09-01 ENCOUNTER — OFFICE VISIT (OUTPATIENT)
Dept: INTERNAL MEDICINE CLINIC | Age: 30
End: 2020-09-01
Payer: MEDICAID

## 2020-09-01 VITALS
SYSTOLIC BLOOD PRESSURE: 120 MMHG | WEIGHT: 265.2 LBS | HEART RATE: 71 BPM | OXYGEN SATURATION: 98 % | TEMPERATURE: 97.5 F | BODY MASS INDEX: 30.68 KG/M2 | HEIGHT: 78 IN | DIASTOLIC BLOOD PRESSURE: 84 MMHG

## 2020-09-01 DIAGNOSIS — E55.9 VITAMIN D INSUFFICIENCY: ICD-10-CM

## 2020-09-01 DIAGNOSIS — R73.03 PREDIABETES: ICD-10-CM

## 2020-09-01 PROBLEM — Z00.00 ANNUAL PHYSICAL EXAM: Status: ACTIVE | Noted: 2020-09-01

## 2020-09-01 LAB
CHOLESTEROL, TOTAL: 162 MG/DL (ref 0–199)
HDLC SERPL-MCNC: 60 MG/DL (ref 40–60)
LDL CHOLESTEROL CALCULATED: 93 MG/DL
TRIGL SERPL-MCNC: 47 MG/DL (ref 0–150)
VITAMIN D 25-HYDROXY: 27.1 NG/ML
VLDLC SERPL CALC-MCNC: 9 MG/DL

## 2020-09-01 PROCEDURE — 90471 IMMUNIZATION ADMIN: CPT | Performed by: INTERNAL MEDICINE

## 2020-09-01 PROCEDURE — 90715 TDAP VACCINE 7 YRS/> IM: CPT | Performed by: INTERNAL MEDICINE

## 2020-09-01 PROCEDURE — 99385 PREV VISIT NEW AGE 18-39: CPT | Performed by: INTERNAL MEDICINE

## 2020-09-01 SDOH — ECONOMIC STABILITY: INCOME INSECURITY: HOW HARD IS IT FOR YOU TO PAY FOR THE VERY BASICS LIKE FOOD, HOUSING, MEDICAL CARE, AND HEATING?: NOT HARD AT ALL

## 2020-09-01 SDOH — ECONOMIC STABILITY: FOOD INSECURITY: WITHIN THE PAST 12 MONTHS, THE FOOD YOU BOUGHT JUST DIDN'T LAST AND YOU DIDN'T HAVE MONEY TO GET MORE.: NEVER TRUE

## 2020-09-01 SDOH — ECONOMIC STABILITY: FOOD INSECURITY: WITHIN THE PAST 12 MONTHS, YOU WORRIED THAT YOUR FOOD WOULD RUN OUT BEFORE YOU GOT MONEY TO BUY MORE.: NEVER TRUE

## 2020-09-01 ASSESSMENT — ENCOUNTER SYMPTOMS
COUGH: 0
NAUSEA: 0
EYE REDNESS: 0
COLOR CHANGE: 0
ABDOMINAL PAIN: 0
DIARRHEA: 0
CONSTIPATION: 1
SHORTNESS OF BREATH: 0

## 2020-09-01 ASSESSMENT — PATIENT HEALTH QUESTIONNAIRE - PHQ9
2. FEELING DOWN, DEPRESSED OR HOPELESS: 0
SUM OF ALL RESPONSES TO PHQ9 QUESTIONS 1 & 2: 0
SUM OF ALL RESPONSES TO PHQ QUESTIONS 1-9: 0
1. LITTLE INTEREST OR PLEASURE IN DOING THINGS: 0
SUM OF ALL RESPONSES TO PHQ QUESTIONS 1-9: 0

## 2020-09-01 NOTE — PROGRESS NOTES
[facility]  Establish care visit   2020    Nicolette Sanders (:  1990) saeid 34 y.o. male, here to establish care. Chief Complaint   Patient presents with    New Patient     establish care        Patient Active Problem List   Diagnosis    Syncope    Presence of single chamber implantable cardioverter-defibrillator (ICD)    Intra-atrial reentrant tachycardia    Implanted defibrillator electrode lead fracture    S/P ablation of accessory bypass tract    SVT (supraventricular tachycardia) ([address]    Prediabetes    Annual physical exam       HPI  35 yo man with Hx of intraarterial reentrant tachycardia s/p ablation of accessory bypass tract, history of ICD, which was removed in 2019, negative work-up for HOCM, prediabetes, establishing care. Here for his annual physical, has no concerns. 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   No syncope episodes since   - Exercise: Professionally basketball  - Diet: better since diagnosed with prediabetes in February    Screening:   - Colon cancer: no FH of colon cancer.   - Hypertension: BP WNL today   - Diabetes mellitus/ Hyperlipidemia:   Lab Results   Component Value Date    GLUCOSE 99 10/08/2018   - HIV: negative   - Domestic violence: pt denies  - Depression:  PHQ2 screen-negative  - Vision/ hearing: No issues  - Dentist: yearly     Immunizations:  Immunization History   Administered Date(s) Administered    DTaP vaccine 1993, 1993, 1993, 1994, 02/10/1998    HPV Quadrivalent (Gardasil) 2014    Hepatitis A Adult (Havrix, Vaqta) 2010    Hepatitis A Ped/Adol (Havrix, Vaqta) 05/15/2012    Hepatitis B 02/10/1998, 1998, 02/15/1999    Hib vaccine 1993, 1993, 1993, 1994    Influenza Vaccine, unspecified formulation 2018    Influenza Virus Vaccine 10/15/2016    Influenza, Live, Intranasal, Quadv, (Flumist 2-49 Substance and Sexual Activity    Alcohol use: No    Drug use: No    Sexual activity: Not on file   Lifestyle    Physical activity     Days per week: Not on file     Minutes per session: Not on file    Stress: Not on file   Relationships    Social connections     Talks on phone: Not on file     Gets together: Not on file     Attends Jewish service: Not on file     Active member of club or organization: Not on file     Attends meetings of clubs or organizations: Not on file     Relationship status: Not on file    Intimate partner violence     Fear of current or ex partner: Not on file     Emotionally abused: Not on file     Physically abused: Not on file     Forced sexual activity: Not on file   Other Topics Concern    Not on file   Social History Narrative    Not on file        Family History   Problem Relation Age of Onset    No Known Problems Mother     No Known Problems Father        PE  Vitals:    09/01/20 0852   BP: 120/84   Site: Right Upper Arm   Pulse: 71   Temp: 97.5 °F (36.4 °C)   SpO2: 98%   Weight: 265 lb 3.2 oz (120.3 kg)   Height: 6' 6\" (1.981 m)     Estimated body mass index is 30.65 kg/m² as calculated from the following:    Height as of this encounter: 6' 6\" (1.981 m). Weight as of this encounter: 265 lb 3.2 oz (120.3 kg). Physical Exam  Vitals signs reviewed. Constitutional:       General: He is not in acute distress. Appearance: Normal appearance. He is well-developed. He is not ill-appearing, toxic-appearing or diaphoretic. HENT:      Head: Normocephalic and atraumatic. Nose: Nose normal.      Mouth/Throat:      Mouth: Mucous membranes are moist.      Pharynx: Oropharynx is clear. No oropharyngeal exudate or posterior oropharyngeal erythema. Eyes:      General: No scleral icterus. Conjunctiva/sclera: Conjunctivae normal.      Pupils: Pupils are equal, round, and reactive to light. Neck:      Musculoskeletal: Normal range of motion and neck supple. Cardiovascular:      Rate and Rhythm: Normal rate and regular rhythm. Heart sounds: Normal heart sounds. Pulmonary:      Effort: Pulmonary effort is normal. No respiratory distress. Breath sounds: Normal breath sounds. Abdominal:      General: There is no distension. Palpations: Abdomen is soft. Tenderness: There is no abdominal tenderness. Musculoskeletal: Normal range of motion. General: No swelling or deformity. Right lower leg: No edema. Left lower leg: No edema. Skin:     General: Skin is warm and dry. Coloration: Skin is not jaundiced. Neurological:      Mental Status: He is alert and oriented to person, place, and time.    Psychiatric:         Behavior: Behavior normal.         Immunization History   Administered Date(s) Administered    DTaP vaccine 02/25/1993, 04/22/1993, 06/22/1993, 06/21/1994, 02/10/1998    HPV Quadrivalent (Gardasil) 05/13/2014    Hepatitis A Adult (Havrix, Vaqta) 11/04/2010    Hepatitis A Ped/Adol (Havrix, Vaqta) 05/15/2012    Hepatitis B 02/10/1998, 05/06/1998, 02/15/1999    Hib vaccine 02/25/1993, 04/22/1993, 06/22/1993, 03/21/1994    Influenza Vaccine, unspecified formulation 01/22/2018    Influenza Virus Vaccine 10/15/2016    Influenza, Live, Intranasal, Quadv, (Flumist 2-49 yrs) 11/04/2010    MMR 03/21/1994, 02/15/1999    Meningococcal MPSV4 (Menomune) 10/17/2007, 05/15/2012    PPD Test 07/07/2016    Polio OPV 02/25/1993, 04/22/1993, 06/21/1994, 02/10/1998    Td, unspecified formulation 10/18/2004    Tdap (Boostrix, Adacel) 02/05/2009    Varicella (Varivax) 04/17/2003       Health Maintenance   Topic Date Due    A1C test (Diabetic or Prediabetic)  12/21/2002    Varicella vaccine (2 of 2 - 2-dose childhood series) 12/02/2010    Hepatitis A vaccine (2 of 2 - 2-dose series) 11/15/2012    DTaP/Tdap/Td vaccine (7 - Td) 02/05/2019    Flu vaccine (1) 09/01/2020    Hepatitis B vaccine  Completed    Hib vaccine Completed    HIV screen  Completed    Meningococcal (ACWY) vaccine  Aged Out    Pneumococcal 0-64 years Vaccine  Aged Out       PSH, PMH, SH and FH reviewed and noted. Recent and past labs, tests and consults also reviewed. Recent or new meds also reviewed. ASSESSMENT/ PLAN:    Annual physical exam  Here to establish care and for his annual physical exam, overall doing well from a clinical standpoint and has no acute concerns. As for his health maintenance:  -Not indicated for early cancer screening, no known family history  -HOCM ruled out with extensive work-up in [place], s/p ablation of accessory bypass tract, s/p ICD removal 2018. No syncope since. Asymptomatic from cardiac standpoint. -BP WNL today  -Noted A1c in prediabetes range, repeat A1c today. -lipid panel  -Vitamin D  -HIV negative (2020)  -received Tdap today, declines flu  -Negative DV and depression screen  -Advised to eat a healthy, well-balanced diet  -Advised to exercise at least 30min/day 5x/week for heart and bone health  -Advised to check skin regularly for now moles or changes in moles. wear sunscreen at least SPF 30 and don't forget to reapply every 3-4 hours or if you are in the water  -Follow up with dentist at least twice/year.  -Follow up with eye doctor at least once/year. Orders Placed This Encounter   Procedures    Tdap (age 6y and older) IM (Boostrix)    Hemoglobin A1C    Lipid Panel    Vitamin D 25 Hydroxy     No orders of the defined types were placed in this encounter. There are no discontinued medications. No follow-ups on file. Montserrat Celis MD    This dictation was generated by voice recognition computer software. Although all attempts are made to edit the dictation for accuracy, there may be errors in the transcription that are not intended.

## 2020-09-01 NOTE — ASSESSMENT & PLAN NOTE
Here to establish care and for his annual physical exam, overall doing well from a clinical standpoint and has no acute concerns. As for his health maintenance:  -Not indicated for early cancer screening, no known family history  -HOCM ruled out with extensive work-up in [place], s/p ablation of accessory bypass tract, s/p ICD removal 2018. No syncope since. Asymptomatic from cardiac standpoint. -BP WNL today  -Noted A1c in prediabetes range, repeat A1c today. -lipid panel  -Vitamin D  -HIV negative (2020)  -received Tdap today, declines flu  -Negative DV and depression screen  -Advised to eat a healthy, well-balanced diet  -Advised to exercise at least 30min/day 5x/week for heart and bone health  -Advised to check skin regularly for now moles or changes in moles. wear sunscreen at least SPF 30 and don't forget to reapply every 3-4 hours or if you are in the water  -Follow up with dentist at least twice/year.  -Follow up with eye doctor at least once/year.

## 2020-09-02 LAB
ESTIMATED AVERAGE GLUCOSE: 125.5 MG/DL
HBA1C MFR BLD: 6 %

## 2020-09-02 RX ORDER — MELATONIN
1000 DAILY
Qty: 30 TABLET | Refills: 5 | Status: SHIPPED | OUTPATIENT
Start: 2020-09-02

## 2020-10-01 PROBLEM — Z00.00 ANNUAL PHYSICAL EXAM: Status: RESOLVED | Noted: 2020-09-01 | Resolved: 2020-10-01
